# Patient Record
Sex: FEMALE | Race: WHITE | URBAN - METROPOLITAN AREA
[De-identification: names, ages, dates, MRNs, and addresses within clinical notes are randomized per-mention and may not be internally consistent; named-entity substitution may affect disease eponyms.]

---

## 2024-12-07 ENCOUNTER — APPOINTMENT (OUTPATIENT)
Dept: URGENT CARE | Age: 54
End: 2024-12-07
Payer: COMMERCIAL

## 2024-12-09 ENCOUNTER — OFFICE VISIT (OUTPATIENT)
Dept: ORTHOPEDIC SURGERY | Facility: CLINIC | Age: 54
End: 2024-12-09
Payer: COMMERCIAL

## 2024-12-09 DIAGNOSIS — S52.592A OTHER CLOSED FRACTURE OF DISTAL END OF LEFT RADIUS, INITIAL ENCOUNTER: ICD-10-CM

## 2024-12-09 DIAGNOSIS — S62.002A OCCULT CLOSED FRACTURE OF SCAPHOID BONE OF LEFT WRIST, INITIAL ENCOUNTER: ICD-10-CM

## 2024-12-09 PROCEDURE — 99214 OFFICE O/P EST MOD 30 MIN: CPT | Mod: 57 | Performed by: STUDENT IN AN ORGANIZED HEALTH CARE EDUCATION/TRAINING PROGRAM

## 2024-12-09 PROCEDURE — L3809 WHFO W/O JOINTS PRE OTS: HCPCS | Performed by: STUDENT IN AN ORGANIZED HEALTH CARE EDUCATION/TRAINING PROGRAM

## 2024-12-09 PROCEDURE — 99204 OFFICE O/P NEW MOD 45 MIN: CPT | Performed by: STUDENT IN AN ORGANIZED HEALTH CARE EDUCATION/TRAINING PROGRAM

## 2024-12-09 PROCEDURE — 25560 CLTX RDL&ULN SHFT FX WO MNPJ: CPT | Performed by: STUDENT IN AN ORGANIZED HEALTH CARE EDUCATION/TRAINING PROGRAM

## 2024-12-09 PROCEDURE — 1036F TOBACCO NON-USER: CPT | Performed by: STUDENT IN AN ORGANIZED HEALTH CARE EDUCATION/TRAINING PROGRAM

## 2024-12-09 PROCEDURE — 25600 CLTX DST RDL FX/EPHYS SEP WO: CPT | Performed by: STUDENT IN AN ORGANIZED HEALTH CARE EDUCATION/TRAINING PROGRAM

## 2024-12-09 NOTE — PROGRESS NOTES
Acute Injury New Patient Visit    HPI: Odessa is a 54 y.o.female who presents today with new complaints of wrist injury.  She states that on 12/4/2024, she was walking into a work event at a conference, caught her heel on the carpet, and fell directly onto her left wrist in a FOOSH type injury.  She has swelling about the distal radius.  She denies any pain or swelling about the elbow.  She also has some pain on the base of the thumb.  She denies any numbness and tingling.  This is a Worker's Comp. claim as this was at a work event.  She went to the urgent care in [place] and had x-rays which were concerning for a subtle impacted fracture of the distal radius.  She was placed in a splint.    Plan: For this left distal radius fracture that is nondisplaced, as well as a concern for an occult scaphoid fracture given exam findings of snuffbox tenderness, we will place her in a Exos thumb spica brace to be worn at all times except for showering and skin care.  Will follow-up in 2 to 3 weeks with repeat x-rays of the left wrist with scaphoid view.  She can continue ibuprofen at home as well as other conservative treat measures as discussed.    Assessment:   Problem List Items Addressed This Visit    None  Visit Diagnoses       Other closed fracture of distal end of left radius, initial encounter        Relevant Orders    Long Thumb Spica    Occult closed fracture of scaphoid bone of left wrist, initial encounter        Relevant Orders    Long Thumb Spica            Diagnostics: Reviewed all relevant imaging including x-ray, MRI, CT, and US.      Procedure:  Procedures    Physical Exam:  GENERAL:  No obvious acute distress.  NEURO:  Distally neurovascularly intact.  Sensation intact to light touch.  Extremity: Left wrist exam:  Skin healthy and intact  Mild gross swelling with no ecchymosis  No erythema or warmth  Volar flexion, dorsiflexion, pronation/supination without limitation  TENDER to palpation over distal  radius  No tenderness to palpation over distal ulna or TFCC  TENDER to palpation over the scaphoid  Negative piano key sign  Negative Finkelstein test  Negative Langston's test  Neurovascular exam normal distally    Orders Placed This Encounter    Long Thumb Spica      At the conclusion of the visit there were no further questions by the patient/family regarding their plan of care.  Patient was instructed to call or return with any issues, questions, or concerns regarding their injury and/or treatment plan described above.     12/09/24 at 10:49 AM - Dexter Jaramillo,     Office: [phone]    This note was prepared using voice recognition software.  The details of this note are correct and have been reviewed, and corrected to the best of my ability.  Some grammatical errors may persist related to the Dragon software.

## 2024-12-27 ENCOUNTER — APPOINTMENT (OUTPATIENT)
Dept: ORTHOPEDIC SURGERY | Facility: CLINIC | Age: 54
End: 2024-12-27
Payer: COMMERCIAL

## 2024-12-27 ENCOUNTER — HOSPITAL ENCOUNTER (OUTPATIENT)
Dept: RADIOLOGY | Facility: HOSPITAL | Age: 54
Discharge: HOME | End: 2024-12-27

## 2024-12-27 DIAGNOSIS — S52.592A OTHER CLOSED FRACTURE OF DISTAL END OF LEFT RADIUS, INITIAL ENCOUNTER: ICD-10-CM

## 2024-12-27 DIAGNOSIS — S62.002A OCCULT CLOSED FRACTURE OF SCAPHOID BONE OF LEFT WRIST, INITIAL ENCOUNTER: ICD-10-CM

## 2024-12-27 PROCEDURE — 73110 X-RAY EXAM OF WRIST: CPT | Mod: LT

## 2024-12-27 NOTE — PROGRESS NOTES
Acute Injury New Patient Visit    HPI: Odessa is a 54 y.o.female who presents today for follow-up of her left distal radius fracture.  There is also concern for occult scaphoid fracture.  She states that she has been doing well in her brace.  She denies any elbow pain.  She denies any interval falls or injuries.  Swelling and bruising have resolved.  She denies any numbness and tingling.    On 12/9/2024, she presented with new complaints of wrist injury.  She states that on 12/4/2024, she was walking into a work event at a conference, caught her heel on the carpet, and fell directly onto her left wrist in a FOOSH type injury.  She has swelling about the distal radius.  She denies any pain or swelling about the elbow.  She also has some pain on the base of the thumb.  She denies any numbness and tingling.  This is a Worker's Comp. claim as this was at a work event.  She went to the urgent care in [place] and had x-rays which were concerning for a subtle impacted fracture of the distal radius.  She was placed in a splint.    Plan: Today, on 12/27/2024, for this left distal radius fracture, continue concern for occult scaphoid fracture given snuffbox tenderness, and now what is likely a dorsal avulsion type fracture of the triquetrium, we will maintain her in her Exos thumb spica brace for another 2 weeks.  Repeat x-rays of the left wrist with scaphoid view at follow-up.    On 12/9/2024, for this left distal radius fracture that is nondisplaced, as well as a concern for an occult scaphoid fracture given exam findings of snuffbox tenderness, we will place her in a Exos thumb spica brace to be worn at all times except for showering and skin care.  Will follow-up in 2 to 3 weeks with repeat x-rays of the left wrist with scaphoid view.  She can continue ibuprofen at home as well as other conservative treat measures as discussed.    Assessment:   Problem List Items Addressed This Visit    None  Visit Diagnoses       Other  closed fracture of distal end of left radius, initial encounter        Relevant Orders    XR wrist left 3+ views    Occult closed fracture of scaphoid bone of left wrist, initial encounter        Relevant Orders    XR wrist left 3+ views              Diagnostics: Reviewed all relevant imaging including x-ray, MRI, CT, and US.      Procedure:  Procedures    Physical Exam:  GENERAL:  No obvious acute distress.  NEURO:  Distally neurovascularly intact.  Sensation intact to light touch.  Extremity: Left wrist exam:  Skin healthy and intact  Mild gross swelling with no ecchymosis  No erythema or warmth  Volar flexion, dorsiflexion, pronation/supination without limitation  TENDER to palpation over distal radius  TENDER over the ulnar-sided carpal bones  No tenderness to palpation over distal ulna or TFCC  TENDER to palpation over the scaphoid  Negative piano key sign  Negative Finkelstein test  Negative Langston's test  Neurovascular exam normal distally    Orders Placed This Encounter    XR wrist left 3+ views      At the conclusion of the visit there were no further questions by the patient/family regarding their plan of care.  Patient was instructed to call or return with any issues, questions, or concerns regarding their injury and/or treatment plan described above.     12/27/24 at 11:16 AM - Dexter Jaramillo DO    Office: [phone]    This note was prepared using voice recognition software.  The details of this note are correct and have been reviewed, and corrected to the best of my ability.  Some grammatical errors may persist related to the Dragon software.

## 2025-01-13 ENCOUNTER — HOSPITAL ENCOUNTER (OUTPATIENT)
Dept: RADIOLOGY | Facility: HOSPITAL | Age: 55
Discharge: HOME | End: 2025-01-13
Payer: COMMERCIAL

## 2025-01-13 ENCOUNTER — APPOINTMENT (OUTPATIENT)
Dept: ORTHOPEDIC SURGERY | Facility: CLINIC | Age: 55
End: 2025-01-13
Payer: COMMERCIAL

## 2025-01-13 DIAGNOSIS — S62.002A OCCULT CLOSED FRACTURE OF SCAPHOID BONE OF LEFT WRIST, INITIAL ENCOUNTER: ICD-10-CM

## 2025-01-13 DIAGNOSIS — S62.112D: ICD-10-CM

## 2025-01-13 DIAGNOSIS — S52.592A OTHER CLOSED FRACTURE OF DISTAL END OF LEFT RADIUS, INITIAL ENCOUNTER: Primary | ICD-10-CM

## 2025-01-13 PROCEDURE — 73110 X-RAY EXAM OF WRIST: CPT | Mod: LT

## 2025-01-13 PROCEDURE — 73110 X-RAY EXAM OF WRIST: CPT | Mod: LEFT SIDE | Performed by: RADIOLOGY

## 2025-01-13 NOTE — PROGRESS NOTES
Acute Injury New Patient Visit    HPI: Odessa is a 54 y.o.female who presents today for follow-up of her left distal radius fracture.  At last visit, she was actually found to have a avulsion type of the dorsum of the triquetrum.  She has been immobilized in her thumb spica brace for about 5 weeks now.  Radial and thumb side are feeling much better.  She still has some tenderness over the dorsal aspect of the ulnar wrist.  She denies any interval falls or injuries.  She denies any swelling or bruising.  She denies any numbness tingling.  She denies any elbow pain.    On 12/27/2024, she presented for follow-up of her left distal radius fracture.  There is also concern for occult scaphoid fracture.  She states that she has been doing well in her brace.  She denies any elbow pain.  She denies any interval falls or injuries.  Swelling and bruising have resolved.  She denies any numbness and tingling.    On 12/9/2024, she presented with new complaints of wrist injury.  She states that on 12/4/2024, she was walking into a work event at a conference, caught her heel on the carpet, and fell directly onto her left wrist in a FOOSH type injury.  She has swelling about the distal radius.  She denies any pain or swelling about the elbow.  She also has some pain on the base of the thumb.  She denies any numbness and tingling.  This is a Worker's Comp. claim as this was at a work event.  She went to the urgent care in [place] and had x-rays which were concerning for a subtle impacted fracture of the distal radius.  She was placed in a splint.    Plan: Today, on 1/13/2025, we will have her come out of her brace altogether and work on range of motion.  Starting next week, she can return to weightbearing activities as tolerated.  Will follow-up in 2 weeks.  No repeat x-rays if she is doing better.    On 12/27/2024, for this left distal radius fracture, continue concern for occult scaphoid fracture given snuffbox tenderness, and now  what is likely a dorsal avulsion type fracture of the triquetrium, we will maintain her in her Exos thumb spica brace for another 2 weeks.  Repeat x-rays of the left wrist with scaphoid view at follow-up.    On 12/9/2024, for this left distal radius fracture that is nondisplaced, as well as a concern for an occult scaphoid fracture given exam findings of snuffbox tenderness, we will place her in a Exos thumb spica brace to be worn at all times except for showering and skin care.  Will follow-up in 2 to 3 weeks with repeat x-rays of the left wrist with scaphoid view.  She can continue ibuprofen at home as well as other conservative treat measures as discussed.    Assessment:   Problem List Items Addressed This Visit    None  Visit Diagnoses       Other closed fracture of distal end of left radius, initial encounter    -  Primary    Occult closed fracture of scaphoid bone of left wrist, initial encounter        Relevant Orders    XR wrist left 3+ views    Chip fracture of triquetral bone of left wrist, with routine healing, subsequent encounter                    Diagnostics: Reviewed all relevant imaging including x-ray, MRI, CT, and US.      Procedure:  Procedures    Physical Exam:  GENERAL:  No obvious acute distress.  NEURO:  Distally neurovascularly intact.  Sensation intact to light touch.  Extremity: Left wrist exam:  Skin healthy and intact  Mild gross swelling with no ecchymosis  No erythema or warmth  Volar flexion, dorsiflexion, pronation/supination without limitation  No pain today to palpation over distal radius  Still mildly TENDER over the ulnar-sided carpal bones  No tenderness to palpation over distal ulna or TFCC  No pain today to palpation over the scaphoid  Negative piano key sign  Negative Finkelstein test  Negative Langston's test  Neurovascular exam normal distally    Orders Placed This Encounter    XR wrist left 3+ views      At the conclusion of the visit there were no further questions by the  patient/family regarding their plan of care.  Patient was instructed to call or return with any issues, questions, or concerns regarding their injury and/or treatment plan described above.     01/13/25 at 11:02 AM - Dexter Jaramillo,     Office: [phone]    This note was prepared using voice recognition software.  The details of this note are correct and have been reviewed, and corrected to the best of my ability.  Some grammatical errors may persist related to the Dragon software.

## 2025-01-27 ENCOUNTER — HOSPITAL ENCOUNTER (OUTPATIENT)
Dept: RADIOLOGY | Facility: HOSPITAL | Age: 55
Discharge: HOME | End: 2025-01-27
Payer: COMMERCIAL

## 2025-01-27 ENCOUNTER — APPOINTMENT (OUTPATIENT)
Dept: ORTHOPEDIC SURGERY | Facility: CLINIC | Age: 55
End: 2025-01-27
Payer: COMMERCIAL

## 2025-01-27 DIAGNOSIS — S62.112D: ICD-10-CM

## 2025-01-27 DIAGNOSIS — S69.82XA TFCC (TRIANGULAR FIBROCARTILAGE COMPLEX) INJURY, LEFT, INITIAL ENCOUNTER: ICD-10-CM

## 2025-01-27 DIAGNOSIS — S62.112A CHIP FRACTURE OF TRIQUETRUM, LEFT, CLOSED, INITIAL ENCOUNTER: ICD-10-CM

## 2025-01-27 DIAGNOSIS — M77.8 TENDINITIS OF LEFT WRIST: Primary | ICD-10-CM

## 2025-01-27 PROCEDURE — 73110 X-RAY EXAM OF WRIST: CPT | Mod: LT

## 2025-01-27 RX ORDER — NAPROXEN 500 MG/1
500 TABLET ORAL
Qty: 28 TABLET | Refills: 1 | Status: SHIPPED | OUTPATIENT
Start: 2025-01-27 | End: 2025-02-24

## 2025-01-27 NOTE — LETTER
January 27, 2025     Patient: Odessa Blood   YOB: 1970   Date of Visit: 1/27/2025       To Whom It May Concern:    It is my medical opinion that Odessa Blood may return to full duty immediately with no restrictions. Must wear Brace at all times.    If you have any questions or concerns, please don't hesitate to call.         Sincerely,        Dexter Jaramillo,     CC: No Recipients

## 2025-01-27 NOTE — PROGRESS NOTES
Acute Injury New Patient Visit    HPI: Odessa is a 54 y.o.female who presents today for follow-up of her avulsion type fracture of the tricaprin and for unfortunate continued pain 7 weeks out.  At the last visit, we were able to wean her out of her fracture brace and get her back to her normal activities.  She states that at rest she is not having any pain, but is having pain that is worse when she uses it, especially when she goes to push up from the floor.  She has noticed some mild intermittent swelling.  She denies any interval falls or injuries.  She has been trying ice.    On 1/13/2025, she presented for follow-up of her left distal radius fracture.  At last visit, she was actually found to have a avulsion type of the dorsum of the triquetrum.  She has been immobilized in her thumb spica brace for about 5 weeks now.  Radial and thumb side are feeling much better.  She still has some tenderness over the dorsal aspect of the ulnar wrist.  She denies any interval falls or injuries.  She denies any swelling or bruising.  She denies any numbness tingling.  She denies any elbow pain.    On 12/27/2024, she presented for follow-up of her left distal radius fracture.  There is also concern for occult scaphoid fracture.  She states that she has been doing well in her brace.  She denies any elbow pain.  She denies any interval falls or injuries.  Swelling and bruising have resolved.  She denies any numbness and tingling.    On 12/9/2024, she presented with new complaints of wrist injury.  She states that on 12/4/2024, she was walking into a work event at a conference, caught her heel on the carpet, and fell directly onto her left wrist in a FOOSH type injury.  She has swelling about the distal radius.  She denies any pain or swelling about the elbow.  She also has some pain on the base of the thumb.  She denies any numbness and tingling.  This is a Worker's Comp. claim as this was at a work event.  She went to the  urgent care in [place] and had x-rays which were concerning for a subtle impacted fracture of the distal radius.  She was placed in a splint.    Plan: Today, on 1/27/2025, for likely extensor tendinitis and possible TFCC type injury given the tenderness over this exact region on exam and continued pain into this area and her original mechanism of injury, we will obtain an MRI for further evaluation of the soft tissue structures of the left wrist, start her in occupational therapy, settle things down with a wrist pro brace when she is up and active, but does not need to sleep in it, start her naproxen, continue with rest, ice, elevation, and activity modifications.  Will follow-up in 3 weeks or with results of the MRI, whichever one comes first.    On 1/13/2025, we will have her come out of her brace altogether and work on range of motion.  Starting next week, she can return to weightbearing activities as tolerated.  Will follow-up in 2 weeks.  No repeat x-rays if she is doing better.    On 12/27/2024, for this left distal radius fracture, continue concern for occult scaphoid fracture given snuffbox tenderness, and now what is likely a dorsal avulsion type fracture of the triquetrium, we will maintain her in her Exos thumb spica brace for another 2 weeks.  Repeat x-rays of the left wrist with scaphoid view at follow-up.    On 12/9/2024, for this left distal radius fracture that is nondisplaced, as well as a concern for an occult scaphoid fracture given exam findings of snuffbox tenderness, we will place her in a Exos thumb spica brace to be worn at all times except for showering and skin care.  Will follow-up in 2 to 3 weeks with repeat x-rays of the left wrist with scaphoid view.  She can continue ibuprofen at home as well as other conservative treat measures as discussed.    Assessment:   Problem List Items Addressed This Visit    None  Visit Diagnoses       Tendinitis of left wrist    -  Primary    Chip fracture of  triquetral bone of left wrist, with routine healing, subsequent encounter        Relevant Medications    naproxen (Naprosyn) 500 mg tablet    Other Relevant Orders    XR wrist left 3+ views    Referral to Occupational Therapy    MR wrist left wo IV contrast    TFCC (triangular fibrocartilage complex) injury, left, initial encounter        Relevant Medications    naproxen (Naprosyn) 500 mg tablet    Other Relevant Orders    Referral to Occupational Therapy    MR wrist left wo IV contrast    Chip fracture of triquetrum, left, closed, initial encounter        Relevant Medications    naproxen (Naprosyn) 500 mg tablet    Other Relevant Orders    Referral to Occupational Therapy    MR wrist left wo IV contrast                  Diagnostics: Reviewed all relevant imaging including x-ray, MRI, CT, and US.      Procedure:  Procedures    Physical Exam:  GENERAL:  No obvious acute distress.  NEURO:  Distally neurovascularly intact.  Sensation intact to light touch.  Extremity: Left wrist exam:  Skin healthy and intact  Mild gross swelling with no ecchymosis  No erythema or warmth  Volar flexion, dorsiflexion, pronation/supination without limitation  No pain today to palpation over distal radius  Still minimally TENDER over the ulnar-sided carpal bones  TENDER to palpation over TFCC today and also into the ulnar-sided extensor tendons, primarily the ECU distally as it crosses the wrist  No pain today to palpation over the scaphoid  Negative piano key sign  Negative Finkelstein test  Negative Langston's test  Neurovascular exam normal distally    Orders Placed This Encounter    XR wrist left 3+ views    MR wrist left wo IV contrast    Referral to Occupational Therapy    naproxen (Naprosyn) 500 mg tablet      At the conclusion of the visit there were no further questions by the patient/family regarding their plan of care.  Patient was instructed to call or return with any issues, questions, or concerns regarding their injury and/or  treatment plan described above.     01/27/25 at 10:55 AM - Dexter Jaramillo,     Office: [phone]    This note was prepared using voice recognition software.  The details of this note are correct and have been reviewed, and corrected to the best of my ability.  Some grammatical errors may persist related to the Dragon software.

## 2025-01-27 NOTE — Clinical Note
January 27, 2025     Patient: Odessa Blood   YOB: 1970   Date of Visit: 1/27/2025       To Whom It May Concern:    It is my medical opinion that Odessa Blood {Work release (duty restriction):00203}.    If you have any questions or concerns, please don't hesitate to call.         Sincerely,        Dexter Jaramillo,     CC: No Recipients

## 2025-01-31 ENCOUNTER — TELEPHONE (OUTPATIENT)
Dept: PHYSICAL THERAPY | Facility: CLINIC | Age: 55
End: 2025-01-31
Payer: COMMERCIAL

## 2025-01-31 NOTE — TELEPHONE ENCOUNTER
LVM with patient to call back to schedule her OT eval that [facility] approved    [facility] 10-12V  1/31/2025-3/14/2025

## 2025-02-17 ENCOUNTER — APPOINTMENT (OUTPATIENT)
Dept: ORTHOPEDIC SURGERY | Facility: CLINIC | Age: 55
End: 2025-02-17
Payer: COMMERCIAL

## 2025-02-18 ENCOUNTER — EVALUATION (OUTPATIENT)
Dept: OCCUPATIONAL THERAPY | Facility: CLINIC | Age: 55
End: 2025-02-18
Payer: COMMERCIAL

## 2025-02-18 DIAGNOSIS — S69.82XA TFCC (TRIANGULAR FIBROCARTILAGE COMPLEX) INJURY, LEFT, INITIAL ENCOUNTER: ICD-10-CM

## 2025-02-18 DIAGNOSIS — S62.112A CHIP FRACTURE OF TRIQUETRUM, LEFT, CLOSED, INITIAL ENCOUNTER: ICD-10-CM

## 2025-02-18 DIAGNOSIS — S62.112D: ICD-10-CM

## 2025-02-18 PROCEDURE — 97140 MANUAL THERAPY 1/> REGIONS: CPT | Mod: GO | Performed by: OCCUPATIONAL THERAPIST

## 2025-02-18 PROCEDURE — 97165 OT EVAL LOW COMPLEX 30 MIN: CPT | Mod: GO | Performed by: OCCUPATIONAL THERAPIST

## 2025-02-18 ASSESSMENT — PAIN SCALES - GENERAL: PAINLEVEL_OUTOF10: 3

## 2025-02-18 ASSESSMENT — PAIN DESCRIPTION - DESCRIPTORS: DESCRIPTORS: SHARP;THROBBING

## 2025-02-18 ASSESSMENT — PAIN - FUNCTIONAL ASSESSMENT: PAIN_FUNCTIONAL_ASSESSMENT: 0-10

## 2025-02-18 NOTE — PROGRESS NOTES
Occupational Therapy    Occupational Therapy Evaluation    Name: Odessa Blood  MRN: [id number]  : 1970   DATE: 25   Time Calculation  Start Time: 1020  Stop Time: 1055  Time Calculation (min): 35 min     OT Evaluation Time Entry  OT Evaluation (Low) Time Entry: 25  OT Therapeutic Procedures Time Entry  Manual Therapy Time Entry: 10     Insurance Authorization Request:  [facility]  10-12V 2025-3/14/2025     Assessment:  Patient is a 54 y.o. female who is 10 weeks 6 days s/p left distal radius fracture while participating a conference for work. The fracture was managed conservatively and developed ulnar side wrist pain at the same time. Patient is in the process of receiving a MRI to identify further soft tissue injury.     Patient presented with ulnar side wrist pain, edema, and muscle weakness. She resides home independently with spouse, works as a  of compliance. 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 Educated patient on modified wrist positioning and motor pattern during exercise for pain control. Also applied K-tape and issued her M brace to provide support to ulnar side wrist pain. Patient will benefit from skilled OT for pain management and progressive strengthening to support return to all ADL/ IADL, work, and leisure activities.     Plan:  Outpatient Plan  OT Plan: Modalities, therapeutic exercise, therapeutic activity, manual therapy, neuromotor re-ed  Frequency: 1x/wk  Duration: 10 weeks  Rehab Potential: Good  Plan of Care Agreement: Patient    Subjective   Current Problem:  Problem List Items Addressed This Visit    None  Visit Diagnoses       Chip fracture of triquetral bone of left wrist, with routine healing, subsequent encounter        TFCC (triangular fibrocartilage complex) injury, left, initial encounter        Chip fracture of triquetrum, left, closed, initial encounter                 DOI: 2024    Surgical  "Procedure: None  DOS: n/a  Hand Dominance: Right   Affected Extremity: LEFT    Mechanism of Injury: Patient tripped and fell while at a conference for work. Was casted for 6 weeks. Ulnar side wrist pain that never went away.     Precautions: No WB     Pain Assessment:  Location: ulnar side wrist   0/10 at rest, 3/10 at highest  Description: sharp, throbbing      Homeliving/Social Support: Living home with spouse     Work: A     Meaningful Activities: Exercises (Bodyweight, TRX), grooming dogs as a side job, cooking and cleaning, taking care of her dogs    Previous Level of Function Per Patient/Caregiver Report: Independent with ADL, IADL, work and leisure activities    Chief complaint: Pain     Patient stated goals for therapy: \"To get back to working out and be able to use my wrist.\"       Objective   UE Assessment  Observation: Mild edema over ulnar side wrist     Palpation: Fovea's sign (+)    Range of Motion (in degrees)  Shoulder AROM: WNL     PROM: WNL    Elbow AROM: WNL     PROM: WNL    Wrist AROM: flexion 60, ext 50, sup and pro WNL - reports pain in ulnar side wrist      PROM: same with AROM     Digits AROM: WNL   PROM: WNL    Thumb AROM: WNL   PROM:  WNL    Sensation: No paresthesia      Strength:    strength (wrist neutral): R 60#,  L 25#   strength (wrist sup): R 60#,  L 25#   strength (wrist pro): R 60#,  L 25#    Special test: Hand shake test (+)    Treatment Performed: Applied K-tape to ulnar side wrist. Fitted patient with M brace to support ulnar side wrist. Educated patient on modified wrist motor pattern during workouts and daily activities.     Outcome Measures:  Quick Dash Score: at eval 27.27%    OP EDUCATION:  Education  Individual(s) Educated: Patient  Education Provided: Anatomy & Physiology, Symptom management, POC discussed and agreed upon, Risk and benefits of OT discussed with patient or other, Orthotics wearing schedule and precautions  Risk and " Benefits Discussed with Patient/Caregiver/Other: yes  Patient/Caregiver Demonstrated Understanding: yes  Plan of Care Discussed and Agreed Upon: yes  Patient Response to Education: Patient/Caregiver Verbalized Understanding of Information, Patient/Caregiver Performed Return Demonstration of Exercises/Activities, Patient/Caregiver Asked Appropriate Questions     Goals:  By discharge (10 weeks) Odessa Blood  will achieve the following goals:     1. Patient to report pain 0/10 when performing active wrist supination and pronation to support grooming and eating tasks  2. Patient to lift and carry 10# with left hand with no difficulty for dog grooming tasks  4. Patient to demonstrate  strength left hand to increase 10# for returning her regular workout routine   5. Patient to demonstrate proper technique and competence with HEP   6. Patient to score disability rate less than 10% on Quick DASH

## 2025-02-21 ENCOUNTER — HOSPITAL ENCOUNTER (OUTPATIENT)
Dept: RADIOLOGY | Facility: CLINIC | Age: 55
Discharge: HOME | End: 2025-02-21
Payer: COMMERCIAL

## 2025-02-21 DIAGNOSIS — S62.112D: ICD-10-CM

## 2025-02-21 DIAGNOSIS — S62.112A CHIP FRACTURE OF TRIQUETRUM, LEFT, CLOSED, INITIAL ENCOUNTER: ICD-10-CM

## 2025-02-21 DIAGNOSIS — S69.82XA TFCC (TRIANGULAR FIBROCARTILAGE COMPLEX) INJURY, LEFT, INITIAL ENCOUNTER: ICD-10-CM

## 2025-02-21 PROCEDURE — 73221 MRI JOINT UPR EXTREM W/O DYE: CPT | Mod: LT

## 2025-03-13 ENCOUNTER — APPOINTMENT (OUTPATIENT)
Dept: OCCUPATIONAL THERAPY | Facility: CLINIC | Age: 55
End: 2025-03-13
Payer: COMMERCIAL

## 2025-03-13 ENCOUNTER — OFFICE VISIT (OUTPATIENT)
Dept: ORTHOPEDIC SURGERY | Facility: CLINIC | Age: 55
End: 2025-03-13
Payer: COMMERCIAL

## 2025-03-13 DIAGNOSIS — S62.112D: Primary | ICD-10-CM

## 2025-03-13 PROCEDURE — 99213 OFFICE O/P EST LOW 20 MIN: CPT | Performed by: STUDENT IN AN ORGANIZED HEALTH CARE EDUCATION/TRAINING PROGRAM

## 2025-03-13 NOTE — PROGRESS NOTES
Acute Injury New Patient Visit    HPI: Od[place] is a 54 y.o.female who presents today for follow-up of her avulsion fracture of her triquetrum.  She states that overall she is feeling much better.  She has no pain unless she is trying to really strain her wrist like opening a jar or pushing up off the ground.  She has been out of her brace.  She has been doing her occupational therapy and using a wrist wrap and KT bands.  MRI showed mild marrow edema at the dorsal aspect of the triquetrum, the lunate and the distal radius suggestive of resolving marrow contusions versus subacute fracture of the triquetrum.  Previously seen small avulsion fracture fragment at the dorsal aspect of the triquetrum is difficult to visualize due to the limitations of the study.  No obvious injury to the TFCC noted.  There was motion artifact.    On 1/27/2025, she presented for follow-up of her avulsion type fracture of the triquetrum and for unfortunate continued pain 7 weeks out.  At the last visit, we were able to wean her out of her fracture brace and get her back to her normal activities.  She states that at rest she is not having any pain, but is having pain that is worse when she uses it, especially when she goes to push up from the floor.  She has noticed some mild intermittent swelling.  She denies any interval falls or injuries.  She has been trying ice.    On 1/13/2025, she presented for follow-up of her left distal radius fracture.  At last visit, she was actually found to have a avulsion type of the dorsum of the triquetrum.  She has been immobilized in her thumb spica brace for about 5 weeks now.  Radial and thumb side are feeling much better.  She still has some tenderness over the dorsal aspect of the ulnar wrist.  She denies any interval falls or injuries.  She denies any swelling or bruising.  She denies any numbness tingling.  She denies any elbow pain.    On 12/27/2024, she presented for follow-up of her left distal  radius fracture.  There is also concern for occult scaphoid fracture.  She states that she has been doing well in her brace.  She denies any elbow pain.  She denies any interval falls or injuries.  Swelling and bruising have resolved.  She denies any numbness and tingling.    On 12/9/2024, she presented with new complaints of wrist injury.  She states that on 12/4/2024, she was walking into a work event at a conference, caught her heel on the carpet, and fell directly onto her left wrist in a FOOSH type injury.  She has swelling about the distal radius.  She denies any pain or swelling about the elbow.  She also has some pain on the base of the thumb.  She denies any numbness and tingling.  This is a Worker's Comp. claim as this was at a work event.  She went to the urgent care in [place] and had x-rays which were concerning for a subtle impacted fracture of the distal radius.  She was placed in a splint.    Plan: Today, on 3/13/2025, we will have her continue with occupational therapy and home exercises.  She will follow-up as needed if she is not better after her occupational therapy and return to normal activities.    On 1/27/2025, for likely extensor tendinitis and possible TFCC type injury given the tenderness over this exact region on exam and continued pain into this area and her original mechanism of injury, we will obtain an MRI for further evaluation of the soft tissue structures of the left wrist, start her in occupational therapy, settle things down with a wrist pro brace when she is up and active, but does not need to sleep in it, start her naproxen, continue with rest, ice, elevation, and activity modifications.  Will follow-up in 3 weeks or with results of the MRI, whichever one comes first.    On 1/13/2025, we will have her come out of her brace altogether and work on range of motion.  Starting next week, she can return to weightbearing activities as tolerated.  Will follow-up in 2 weeks.  No repeat  x-rays if she is doing better.    On 12/27/2024, for this left distal radius fracture, continue concern for occult scaphoid fracture given snuffbox tenderness, and now what is likely a dorsal avulsion type fracture of the triquetrium, we will maintain her in her Exos thumb spica brace for another 2 weeks.  Repeat x-rays of the left wrist with scaphoid view at follow-up.    On 12/9/2024, for this left distal radius fracture that is nondisplaced, as well as a concern for an occult scaphoid fracture given exam findings of snuffbox tenderness, we will place her in a Exos thumb spica brace to be worn at all times except for showering and skin care.  Will follow-up in 2 to 3 weeks with repeat x-rays of the left wrist with scaphoid view.  She can continue ibuprofen at home as well as other conservative treat measures as discussed.    Assessment:   Problem List Items Addressed This Visit    None  Visit Diagnoses       Chip fracture of triquetral bone of left wrist, with routine healing, subsequent encounter    -  Primary                    Diagnostics: Reviewed all relevant imaging including x-ray, MRI, CT, and US.      Procedure:  Procedures    Physical Exam:  GENERAL:  No obvious acute distress.  NEURO:  Distally neurovascularly intact.  Sensation intact to light touch.  Extremity: Left wrist exam:  Skin healthy and intact  Mild gross swelling with no ecchymosis  No erythema or warmth  Volar flexion, dorsiflexion, pronation/supination without limitation  No pain today to palpation over distal radius  No pain today over the ulnar-sided carpal bones  No pain today to palpation over TFCC today and also into the ulnar-sided extensor tendons, primarily the ECU distally as it crosses the wrist  No pain today to palpation over the scaphoid  Negative piano key sign  Negative Finkelstein test  Negative Langston's test  Neurovascular exam normal distally    No orders of the defined types were placed in this encounter.     At the  conclusion of the visit there were no further questions by the patient/family regarding their plan of care.  Patient was instructed to call or return with any issues, questions, or concerns regarding their injury and/or treatment plan described above.     03/13/25 at 6:14 PM - Dexter Jaramillo,     Office: [phone]    This note was prepared using voice recognition software.  The details of this note are correct and have been reviewed, and corrected to the best of my ability.  Some grammatical errors may persist related to the Dragon software.

## 2025-03-18 ENCOUNTER — APPOINTMENT (OUTPATIENT)
Dept: OCCUPATIONAL THERAPY | Facility: CLINIC | Age: 55
End: 2025-03-18
Payer: COMMERCIAL